# Patient Record
Sex: MALE | Race: OTHER | URBAN - METROPOLITAN AREA
[De-identification: names, ages, dates, MRNs, and addresses within clinical notes are randomized per-mention and may not be internally consistent; named-entity substitution may affect disease eponyms.]

---

## 2024-02-27 ENCOUNTER — APPOINTMENT (OUTPATIENT)
Dept: GENERAL RADIOLOGY | Age: 49
DRG: 871 | End: 2024-02-27
Payer: COMMERCIAL

## 2024-02-27 ENCOUNTER — APPOINTMENT (OUTPATIENT)
Dept: CT IMAGING | Age: 49
DRG: 871 | End: 2024-02-27
Payer: COMMERCIAL

## 2024-02-27 ENCOUNTER — HOSPITAL ENCOUNTER (INPATIENT)
Age: 49
LOS: 1 days | DRG: 871 | End: 2024-02-28
Attending: EMERGENCY MEDICINE | Admitting: STUDENT IN AN ORGANIZED HEALTH CARE EDUCATION/TRAINING PROGRAM
Payer: COMMERCIAL

## 2024-02-27 DIAGNOSIS — J96.01 ACUTE RESPIRATORY FAILURE WITH HYPOXIA (HCC): ICD-10-CM

## 2024-02-27 DIAGNOSIS — E11.10 DIABETIC KETOACIDOSIS WITHOUT COMA ASSOCIATED WITH TYPE 2 DIABETES MELLITUS (HCC): Primary | ICD-10-CM

## 2024-02-27 DIAGNOSIS — R79.89 ELEVATED LACTIC ACID LEVEL: ICD-10-CM

## 2024-02-27 PROBLEM — A41.9 SEPSIS (HCC): Status: ACTIVE | Noted: 2024-02-27

## 2024-02-27 LAB
ALBUMIN SERPL-MCNC: 2.7 G/DL (ref 3.4–5)
ALBUMIN/GLOB SERPL: 0.8 {RATIO} (ref 1.1–2.2)
ALP SERPL-CCNC: 80 U/L (ref 40–129)
ALT SERPL-CCNC: 51 U/L (ref 10–40)
ANION GAP SERPL CALCULATED.3IONS-SCNC: 35 MMOL/L (ref 3–16)
APTT BLD: 33.9 SEC (ref 22.7–35.9)
AST SERPL-CCNC: 81 U/L (ref 15–37)
BASE EXCESS BLDV CALC-SCNC: -20.7 MMOL/L
BASOPHILS # BLD: 0 K/UL (ref 0–0.2)
BASOPHILS NFR BLD: 0 %
BILIRUB SERPL-MCNC: 0.8 MG/DL (ref 0–1)
BUN SERPL-MCNC: 105 MG/DL (ref 7–20)
CALCIUM SERPL-MCNC: 8.2 MG/DL (ref 8.3–10.6)
CHLORIDE SERPL-SCNC: 79 MMOL/L (ref 99–110)
CO2 BLDV-SCNC: 12 MMOL/L
CO2 SERPL-SCNC: 9 MMOL/L (ref 21–32)
COHGB MFR BLDV: 1.4 %
CREAT SERPL-MCNC: 5.1 MG/DL (ref 0.9–1.3)
D DIMER: 1.71 UG/ML FEU (ref 0–0.6)
DEPRECATED RDW RBC AUTO: 14.1 % (ref 12.4–15.4)
DOHLE BOD BLD QL SMEAR: PRESENT
EOSINOPHIL # BLD: 0 K/UL (ref 0–0.6)
EOSINOPHIL NFR BLD: 0 %
FLUAV RNA UPPER RESP QL NAA+PROBE: NEGATIVE
FLUBV AG NPH QL: NEGATIVE
GFR SERPLBLD CREATININE-BSD FMLA CKD-EPI: 13 ML/MIN/{1.73_M2}
GLUCOSE BLD-MCNC: 409 MG/DL (ref 70–99)
GLUCOSE BLD-MCNC: 446 MG/DL (ref 70–99)
HCO3 BLDV-SCNC: 11 MMOL/L (ref 23–29)
HCT VFR BLD AUTO: 35.3 % (ref 40.5–52.5)
HGB BLD-MCNC: 11 G/DL (ref 13.5–17.5)
INR PPP: 1.59 (ref 0.84–1.16)
LACTATE BLDV-SCNC: 12.8 MMOL/L (ref 0.4–1.9)
LACTATE BLDV-SCNC: 14.9 MMOL/L (ref 0.4–1.9)
LYMPHOCYTES # BLD: 8 K/UL (ref 1–5.1)
LYMPHOCYTES NFR BLD: 32 %
MCH RBC QN AUTO: 27.9 PG (ref 26–34)
MCHC RBC AUTO-ENTMCNC: 31.1 G/DL (ref 31–36)
MCV RBC AUTO: 89.7 FL (ref 80–100)
METAMYELOCYTES NFR BLD MANUAL: 1 %
METHGB MFR BLDV: 0.3 %
MONOCYTES # BLD: 3.5 K/UL (ref 0–1.3)
MONOCYTES NFR BLD: 14 %
NEUTROPHILS # BLD: 13.6 K/UL (ref 1.7–7.7)
NEUTROPHILS NFR BLD: 26 %
NEUTS BAND NFR BLD MANUAL: 27 % (ref 0–7)
NEUTS VAC BLD QL SMEAR: PRESENT
NT-PROBNP SERPL-MCNC: 416 PG/ML (ref 0–124)
O2 THERAPY: ABNORMAL
PATH INTERP BLD-IMP: YES
PCO2 BLDV: 47.1 MMHG (ref 40–50)
PERFORMED ON: ABNORMAL
PERFORMED ON: ABNORMAL
PH BLDV: 6.96 [PH] (ref 7.35–7.45)
PLATELET # BLD AUTO: 208 K/UL (ref 135–450)
PLATELET BLD QL SMEAR: ADEQUATE
PMV BLD AUTO: 12.3 FL (ref 5–10.5)
PO2 BLDV: 35 MMHG
POTASSIUM SERPL-SCNC: 4 MMOL/L (ref 3.5–5.1)
PROT SERPL-MCNC: 6 G/DL (ref 6.4–8.2)
PROTHROMBIN TIME: 19 SEC (ref 11.5–14.8)
RBC # BLD AUTO: 3.93 M/UL (ref 4.2–5.9)
SAO2 % BLDV: 38 %
SARS-COV-2 RDRP RESP QL NAA+PROBE: NOT DETECTED
SMUDGE CELLS BLD QL SMEAR: PRESENT
SODIUM SERPL-SCNC: 123 MMOL/L (ref 136–145)
TOXIC GRANULES BLD QL SMEAR: PRESENT
TROPONIN, HIGH SENSITIVITY: 18 NG/L (ref 0–22)
WBC # BLD AUTO: 25.1 K/UL (ref 4–11)

## 2024-02-27 PROCEDURE — 36415 COLL VENOUS BLD VENIPUNCTURE: CPT

## 2024-02-27 PROCEDURE — 83036 HEMOGLOBIN GLYCOSYLATED A1C: CPT

## 2024-02-27 PROCEDURE — 99285 EMERGENCY DEPT VISIT HI MDM: CPT

## 2024-02-27 PROCEDURE — 84145 PROCALCITONIN (PCT): CPT

## 2024-02-27 PROCEDURE — 2580000003 HC RX 258: Performed by: EMERGENCY MEDICINE

## 2024-02-27 PROCEDURE — 84100 ASSAY OF PHOSPHORUS: CPT

## 2024-02-27 PROCEDURE — 93005 ELECTROCARDIOGRAM TRACING: CPT | Performed by: EMERGENCY MEDICINE

## 2024-02-27 PROCEDURE — 96365 THER/PROPH/DIAG IV INF INIT: CPT

## 2024-02-27 PROCEDURE — 6370000000 HC RX 637 (ALT 250 FOR IP): Performed by: EMERGENCY MEDICINE

## 2024-02-27 PROCEDURE — 85379 FIBRIN DEGRADATION QUANT: CPT

## 2024-02-27 PROCEDURE — 94761 N-INVAS EAR/PLS OXIMETRY MLT: CPT

## 2024-02-27 PROCEDURE — 84484 ASSAY OF TROPONIN QUANT: CPT

## 2024-02-27 PROCEDURE — 83880 ASSAY OF NATRIURETIC PEPTIDE: CPT

## 2024-02-27 PROCEDURE — 85730 THROMBOPLASTIN TIME PARTIAL: CPT

## 2024-02-27 PROCEDURE — 87804 INFLUENZA ASSAY W/OPTIC: CPT

## 2024-02-27 PROCEDURE — 83735 ASSAY OF MAGNESIUM: CPT

## 2024-02-27 PROCEDURE — 94640 AIRWAY INHALATION TREATMENT: CPT

## 2024-02-27 PROCEDURE — 71045 X-RAY EXAM CHEST 1 VIEW: CPT

## 2024-02-27 PROCEDURE — 82803 BLOOD GASES ANY COMBINATION: CPT

## 2024-02-27 PROCEDURE — 96375 TX/PRO/DX INJ NEW DRUG ADDON: CPT

## 2024-02-27 PROCEDURE — 83605 ASSAY OF LACTIC ACID: CPT

## 2024-02-27 PROCEDURE — 2700000000 HC OXYGEN THERAPY PER DAY

## 2024-02-27 PROCEDURE — 85025 COMPLETE CBC W/AUTO DIFF WBC: CPT

## 2024-02-27 PROCEDURE — 87181 SC STD AGAR DILUTION PER AGT: CPT

## 2024-02-27 PROCEDURE — 87186 SC STD MICRODIL/AGAR DIL: CPT

## 2024-02-27 PROCEDURE — 85610 PROTHROMBIN TIME: CPT

## 2024-02-27 PROCEDURE — 87040 BLOOD CULTURE FOR BACTERIA: CPT

## 2024-02-27 PROCEDURE — 6360000002 HC RX W HCPCS: Performed by: EMERGENCY MEDICINE

## 2024-02-27 PROCEDURE — 80053 COMPREHEN METABOLIC PANEL: CPT

## 2024-02-27 PROCEDURE — 87635 SARS-COV-2 COVID-19 AMP PRB: CPT

## 2024-02-27 PROCEDURE — 2100000000 HC CCU R&B

## 2024-02-27 PROCEDURE — 71250 CT THORAX DX C-: CPT

## 2024-02-27 PROCEDURE — 87150 DNA/RNA AMPLIFIED PROBE: CPT

## 2024-02-27 RX ORDER — ENOXAPARIN SODIUM 100 MG/ML
40 INJECTION SUBCUTANEOUS DAILY
Status: DISCONTINUED | OUTPATIENT
Start: 2024-02-28 | End: 2024-02-27

## 2024-02-27 RX ORDER — LINEZOLID 2 MG/ML
600 INJECTION, SOLUTION INTRAVENOUS ONCE
Status: COMPLETED | OUTPATIENT
Start: 2024-02-27 | End: 2024-02-28

## 2024-02-27 RX ORDER — HEPARIN SODIUM 5000 [USP'U]/ML
5000 INJECTION, SOLUTION INTRAVENOUS; SUBCUTANEOUS EVERY 8 HOURS SCHEDULED
Status: DISCONTINUED | OUTPATIENT
Start: 2024-02-28 | End: 2024-02-28 | Stop reason: HOSPADM

## 2024-02-27 RX ORDER — DEXAMETHASONE SODIUM PHOSPHATE 4 MG/ML
10 INJECTION, SOLUTION INTRA-ARTICULAR; INTRALESIONAL; INTRAMUSCULAR; INTRAVENOUS; SOFT TISSUE ONCE
Status: COMPLETED | OUTPATIENT
Start: 2024-02-27 | End: 2024-02-27

## 2024-02-27 RX ORDER — LINEZOLID 2 MG/ML
600 INJECTION, SOLUTION INTRAVENOUS EVERY 12 HOURS
Status: DISCONTINUED | OUTPATIENT
Start: 2024-02-28 | End: 2024-02-28 | Stop reason: HOSPADM

## 2024-02-27 RX ORDER — MAGNESIUM SULFATE IN WATER 40 MG/ML
2000 INJECTION, SOLUTION INTRAVENOUS PRN
Status: DISCONTINUED | OUTPATIENT
Start: 2024-02-27 | End: 2024-02-27

## 2024-02-27 RX ORDER — 0.9 % SODIUM CHLORIDE 0.9 %
1000 INTRAVENOUS SOLUTION INTRAVENOUS ONCE
Status: COMPLETED | OUTPATIENT
Start: 2024-02-27 | End: 2024-02-27

## 2024-02-27 RX ORDER — POTASSIUM CHLORIDE 7.45 MG/ML
10 INJECTION INTRAVENOUS PRN
Status: DISCONTINUED | OUTPATIENT
Start: 2024-02-27 | End: 2024-02-27

## 2024-02-27 RX ORDER — SODIUM CHLORIDE 9 MG/ML
INJECTION, SOLUTION INTRAVENOUS CONTINUOUS
Status: DISCONTINUED | OUTPATIENT
Start: 2024-02-27 | End: 2024-02-28 | Stop reason: HOSPADM

## 2024-02-27 RX ORDER — IPRATROPIUM BROMIDE AND ALBUTEROL SULFATE 2.5; .5 MG/3ML; MG/3ML
3 SOLUTION RESPIRATORY (INHALATION) ONCE
Status: COMPLETED | OUTPATIENT
Start: 2024-02-27 | End: 2024-02-27

## 2024-02-27 RX ORDER — DEXTROSE MONOHYDRATE, SODIUM CHLORIDE, AND POTASSIUM CHLORIDE 50; 1.49; 4.5 G/1000ML; G/1000ML; G/1000ML
INJECTION, SOLUTION INTRAVENOUS CONTINUOUS PRN
Status: DISCONTINUED | OUTPATIENT
Start: 2024-02-27 | End: 2024-02-28 | Stop reason: HOSPADM

## 2024-02-27 RX ADMIN — SODIUM CHLORIDE 7.72 UNITS/HR: 9 INJECTION, SOLUTION INTRAVENOUS at 21:54

## 2024-02-27 RX ADMIN — DEXAMETHASONE SODIUM PHOSPHATE 10 MG: 4 INJECTION, SOLUTION INTRAMUSCULAR; INTRAVENOUS at 21:02

## 2024-02-27 RX ADMIN — IPRATROPIUM BROMIDE AND ALBUTEROL SULFATE 3 DOSE: 2.5; .5 SOLUTION RESPIRATORY (INHALATION) at 20:54

## 2024-02-27 RX ADMIN — SODIUM CHLORIDE: 9 INJECTION, SOLUTION INTRAVENOUS at 21:43

## 2024-02-27 RX ADMIN — INSULIN HUMAN 6 UNITS: 100 INJECTION, SOLUTION PARENTERAL at 21:50

## 2024-02-27 RX ADMIN — CEFEPIME 2000 MG: 2 INJECTION, POWDER, FOR SOLUTION INTRAVENOUS at 22:00

## 2024-02-27 RX ADMIN — LINEZOLID 600 MG: 600 INJECTION, SOLUTION INTRAVENOUS at 22:54

## 2024-02-27 RX ADMIN — SODIUM CHLORIDE 1000 ML: 9 INJECTION, SOLUTION INTRAVENOUS at 21:21

## 2024-02-27 ASSESSMENT — PAIN DESCRIPTION - ORIENTATION: ORIENTATION: MID

## 2024-02-27 ASSESSMENT — PAIN DESCRIPTION - PAIN TYPE: TYPE: ACUTE PAIN

## 2024-02-27 ASSESSMENT — PAIN SCALES - GENERAL: PAINLEVEL_OUTOF10: 4

## 2024-02-27 ASSESSMENT — PAIN - FUNCTIONAL ASSESSMENT
PAIN_FUNCTIONAL_ASSESSMENT: PREVENTS OR INTERFERES SOME ACTIVE ACTIVITIES AND ADLS
PAIN_FUNCTIONAL_ASSESSMENT: 0-10

## 2024-02-27 ASSESSMENT — PAIN DESCRIPTION - DESCRIPTORS: DESCRIPTORS: ACHING

## 2024-02-27 ASSESSMENT — PAIN DESCRIPTION - LOCATION: LOCATION: CHEST

## 2024-02-28 VITALS
WEIGHT: 209 LBS | OXYGEN SATURATION: 7 % | BODY MASS INDEX: 35.68 KG/M2 | DIASTOLIC BLOOD PRESSURE: 159 MMHG | HEIGHT: 64 IN | SYSTOLIC BLOOD PRESSURE: 215 MMHG | TEMPERATURE: 97 F

## 2024-02-28 PROBLEM — D72.829 LEUKOCYTOSIS: Status: ACTIVE | Noted: 2024-02-28

## 2024-02-28 PROBLEM — E11.10 DKA (DIABETIC KETOACIDOSIS) (HCC): Status: ACTIVE | Noted: 2024-02-28

## 2024-02-28 PROBLEM — D64.9 ANEMIA: Status: ACTIVE | Noted: 2024-02-28

## 2024-02-28 PROBLEM — J18.9 PNEUMONIA: Status: ACTIVE | Noted: 2024-02-28

## 2024-02-28 PROBLEM — K31.1 GASTRIC OUTLET OBSTRUCTION: Status: ACTIVE | Noted: 2024-02-28

## 2024-02-28 PROBLEM — E87.20 LACTIC ACIDOSIS: Status: ACTIVE | Noted: 2024-02-28

## 2024-02-28 PROBLEM — E87.20 METABOLIC ACIDOSIS: Status: ACTIVE | Noted: 2024-02-28

## 2024-02-28 PROBLEM — N40.0 BPH (BENIGN PROSTATIC HYPERPLASIA): Status: ACTIVE | Noted: 2024-02-28

## 2024-02-28 PROBLEM — N17.9 AKI (ACUTE KIDNEY INJURY) (HCC): Status: ACTIVE | Noted: 2024-02-28

## 2024-02-28 PROBLEM — J96.01 ACUTE RESPIRATORY FAILURE WITH HYPOXIA (HCC): Status: ACTIVE | Noted: 2024-02-28

## 2024-02-28 LAB
25(OH)D3 SERPL-MCNC: 6 NG/ML
ABO + RH BLD: NORMAL
ANION GAP SERPL CALCULATED.3IONS-SCNC: 28 MMOL/L (ref 3–16)
ANION GAP SERPL CALCULATED.3IONS-SCNC: 34 MMOL/L (ref 3–16)
BASE EXCESS BLDV CALC-SCNC: -17 MMOL/L (ref -3–3)
BLD GP AB SCN SERPL QL: NORMAL
BLOOD BANK DISPENSE STATUS: NORMAL
BLOOD BANK PRODUCT CODE: NORMAL
BPU ID: NORMAL
BUN SERPL-MCNC: 111 MG/DL (ref 7–20)
BUN SERPL-MCNC: 112 MG/DL (ref 7–20)
CA-I BLD-SCNC: 1.1 MMOL/L (ref 1.12–1.32)
CALCIUM SERPL-MCNC: 7.7 MG/DL (ref 8.3–10.6)
CALCIUM SERPL-MCNC: 7.8 MG/DL (ref 8.3–10.6)
CHLORIDE SERPL-SCNC: 80 MMOL/L (ref 99–110)
CHLORIDE SERPL-SCNC: 89 MMOL/L (ref 99–110)
CO2 BLDV-SCNC: 14 MMOL/L
CO2 SERPL-SCNC: 13 MMOL/L (ref 21–32)
CO2 SERPL-SCNC: 9 MMOL/L (ref 21–32)
CREAT SERPL-MCNC: 4.2 MG/DL (ref 0.9–1.3)
CREAT SERPL-MCNC: 5.4 MG/DL (ref 0.9–1.3)
DEPRECATED RDW RBC AUTO: 13.6 % (ref 12.4–15.4)
DESCRIPTION BLOOD BANK: NORMAL
EKG ATRIAL RATE: 104 BPM
EKG DIAGNOSIS: NORMAL
EKG P AXIS: 65 DEGREES
EKG P-R INTERVAL: 146 MS
EKG Q-T INTERVAL: 376 MS
EKG QRS DURATION: 106 MS
EKG QTC CALCULATION (BAZETT): 494 MS
EKG R AXIS: 65 DEGREES
EKG T AXIS: -11 DEGREES
EKG VENTRICULAR RATE: 104 BPM
EST. AVERAGE GLUCOSE BLD GHB EST-MCNC: 234.6 MG/DL
GFR SERPLBLD CREATININE-BSD FMLA CKD-EPI: 12 ML/MIN/{1.73_M2}
GFR SERPLBLD CREATININE-BSD FMLA CKD-EPI: 17 ML/MIN/{1.73_M2}
GLUCOSE BLD-MCNC: 180 MG/DL (ref 70–99)
GLUCOSE BLD-MCNC: 248 MG/DL (ref 70–99)
GLUCOSE BLD-MCNC: 274 MG/DL (ref 70–99)
GLUCOSE BLD-MCNC: 315 MG/DL (ref 70–99)
GLUCOSE BLD-MCNC: 360 MG/DL (ref 70–99)
GLUCOSE BLD-MCNC: 387 MG/DL (ref 70–99)
GLUCOSE BLD-MCNC: 443 MG/DL (ref 70–99)
GLUCOSE SERPL-MCNC: 240 MG/DL (ref 70–99)
GLUCOSE SERPL-MCNC: 479 MG/DL (ref 70–99)
HBA1C MFR BLD: 9.8 %
HCO3 BLDV-SCNC: 12.7 MMOL/L (ref 23–29)
HCT VFR BLD AUTO: 25.1 % (ref 40.5–52.5)
HCT VFR BLD AUTO: 27 % (ref 40.5–52.5)
HEMOCCULT STL QL: ABNORMAL
HGB BLD CALC-MCNC: 9.1 GM/DL (ref 13.5–17.5)
HGB BLD-MCNC: 8.2 G/DL (ref 13.5–17.5)
LACTATE BLD-SCNC: 16.7 MMOL/L (ref 0.4–2)
MAGNESIUM SERPL-MCNC: 2.4 MG/DL (ref 1.8–2.4)
MAGNESIUM SERPL-MCNC: 3 MG/DL (ref 1.8–2.4)
MCH RBC QN AUTO: 27.7 PG (ref 26–34)
MCHC RBC AUTO-ENTMCNC: 32.5 G/DL (ref 31–36)
MCV RBC AUTO: 85.2 FL (ref 80–100)
PATH INTERP BLD-IMP: NORMAL
PCO2 BLDV: 42.4 MM HG (ref 40–50)
PERFORMED ON: ABNORMAL
PH BLDV: 7.08 [PH] (ref 7.35–7.45)
PHOSPHATE SERPL-MCNC: 5.1 MG/DL (ref 2.5–4.9)
PHOSPHATE SERPL-MCNC: 7.6 MG/DL (ref 2.5–4.9)
PLATELET # BLD AUTO: 177 K/UL (ref 135–450)
PMV BLD AUTO: 11.4 FL (ref 5–10.5)
PO2 BLDV: 35 MM HG
POC SAMPLE TYPE: ABNORMAL
POTASSIUM BLD-SCNC: 3.4 MMOL/L (ref 3.5–5.1)
POTASSIUM SERPL-SCNC: 3.5 MMOL/L (ref 3.5–5.1)
POTASSIUM SERPL-SCNC: 3.9 MMOL/L (ref 3.5–5.1)
PROCALCITONIN SERPL IA-MCNC: >100 NG/ML (ref 0–0.15)
PTH-INTACT SERPL-MCNC: 92.4 PG/ML (ref 14–72)
RBC # BLD AUTO: 2.95 M/UL (ref 4.2–5.9)
REPORT: NORMAL
SAO2 % BLDV: 47 %
SODIUM BLD-SCNC: 129 MMOL/L (ref 136–145)
SODIUM SERPL-SCNC: 123 MMOL/L (ref 136–145)
SODIUM SERPL-SCNC: 130 MMOL/L (ref 136–145)
WBC # BLD AUTO: 15.7 K/UL (ref 4–11)

## 2024-02-28 PROCEDURE — 2500000003 HC RX 250 WO HCPCS: Performed by: STUDENT IN AN ORGANIZED HEALTH CARE EDUCATION/TRAINING PROGRAM

## 2024-02-28 PROCEDURE — 86850 RBC ANTIBODY SCREEN: CPT

## 2024-02-28 PROCEDURE — 0BH17EZ INSERTION OF ENDOTRACHEAL AIRWAY INTO TRACHEA, VIA NATURAL OR ARTIFICIAL OPENING: ICD-10-PCS | Performed by: STUDENT IN AN ORGANIZED HEALTH CARE EDUCATION/TRAINING PROGRAM

## 2024-02-28 PROCEDURE — 80048 BASIC METABOLIC PNL TOTAL CA: CPT

## 2024-02-28 PROCEDURE — 83605 ASSAY OF LACTIC ACID: CPT

## 2024-02-28 PROCEDURE — 30233N1 TRANSFUSION OF NONAUTOLOGOUS RED BLOOD CELLS INTO PERIPHERAL VEIN, PERCUTANEOUS APPROACH: ICD-10-PCS | Performed by: STUDENT IN AN ORGANIZED HEALTH CARE EDUCATION/TRAINING PROGRAM

## 2024-02-28 PROCEDURE — 85014 HEMATOCRIT: CPT

## 2024-02-28 PROCEDURE — 86901 BLOOD TYPING SEROLOGIC RH(D): CPT

## 2024-02-28 PROCEDURE — 84100 ASSAY OF PHOSPHORUS: CPT

## 2024-02-28 PROCEDURE — 93010 ELECTROCARDIOGRAM REPORT: CPT | Performed by: INTERNAL MEDICINE

## 2024-02-28 PROCEDURE — 86900 BLOOD TYPING SEROLOGIC ABO: CPT

## 2024-02-28 PROCEDURE — 82270 OCCULT BLOOD FECES: CPT

## 2024-02-28 PROCEDURE — 86920 COMPATIBILITY TEST SPIN: CPT

## 2024-02-28 PROCEDURE — 2580000003 HC RX 258: Performed by: NURSE PRACTITIONER

## 2024-02-28 PROCEDURE — 84295 ASSAY OF SERUM SODIUM: CPT

## 2024-02-28 PROCEDURE — 6360000002 HC RX W HCPCS

## 2024-02-28 PROCEDURE — 85027 COMPLETE CBC AUTOMATED: CPT

## 2024-02-28 PROCEDURE — 83735 ASSAY OF MAGNESIUM: CPT

## 2024-02-28 PROCEDURE — 36556 INSERT NON-TUNNEL CV CATH: CPT

## 2024-02-28 PROCEDURE — 82330 ASSAY OF CALCIUM: CPT

## 2024-02-28 PROCEDURE — 86480 TB TEST CELL IMMUN MEASURE: CPT

## 2024-02-28 PROCEDURE — 82947 ASSAY GLUCOSE BLOOD QUANT: CPT

## 2024-02-28 PROCEDURE — 02HV33Z INSERTION OF INFUSION DEVICE INTO SUPERIOR VENA CAVA, PERCUTANEOUS APPROACH: ICD-10-PCS | Performed by: STUDENT IN AN ORGANIZED HEALTH CARE EDUCATION/TRAINING PROGRAM

## 2024-02-28 PROCEDURE — 2500000003 HC RX 250 WO HCPCS: Performed by: NURSE PRACTITIONER

## 2024-02-28 PROCEDURE — 36415 COLL VENOUS BLD VENIPUNCTURE: CPT

## 2024-02-28 PROCEDURE — 6360000002 HC RX W HCPCS: Performed by: STUDENT IN AN ORGANIZED HEALTH CARE EDUCATION/TRAINING PROGRAM

## 2024-02-28 PROCEDURE — 84132 ASSAY OF SERUM POTASSIUM: CPT

## 2024-02-28 PROCEDURE — 2500000003 HC RX 250 WO HCPCS: Performed by: EMERGENCY MEDICINE

## 2024-02-28 PROCEDURE — P9016 RBC LEUKOCYTES REDUCED: HCPCS

## 2024-02-28 PROCEDURE — 82803 BLOOD GASES ANY COMBINATION: CPT

## 2024-02-28 PROCEDURE — 82306 VITAMIN D 25 HYDROXY: CPT

## 2024-02-28 PROCEDURE — 6360000002 HC RX W HCPCS: Performed by: NURSE PRACTITIONER

## 2024-02-28 PROCEDURE — 83970 ASSAY OF PARATHORMONE: CPT

## 2024-02-28 PROCEDURE — 51702 INSERT TEMP BLADDER CATH: CPT

## 2024-02-28 RX ORDER — SODIUM CHLORIDE 0.9 % (FLUSH) 0.9 %
5-40 SYRINGE (ML) INJECTION PRN
Status: DISCONTINUED | OUTPATIENT
Start: 2024-02-28 | End: 2024-02-28 | Stop reason: HOSPADM

## 2024-02-28 RX ORDER — EPINEPHRINE IN SOD CHLOR,ISO 1 MG/10 ML
SYRINGE (ML) INTRAVENOUS
Status: DISCONTINUED
Start: 2024-02-28 | End: 2024-02-28 | Stop reason: HOSPADM

## 2024-02-28 RX ORDER — MAGNESIUM SULFATE IN WATER 40 MG/ML
2000 INJECTION, SOLUTION INTRAVENOUS PRN
Status: DISCONTINUED | OUTPATIENT
Start: 2024-02-28 | End: 2024-02-28

## 2024-02-28 RX ORDER — KETAMINE HYDROCHLORIDE 50 MG/ML
150 INJECTION, SOLUTION INTRAMUSCULAR; INTRAVENOUS ONCE
Status: DISCONTINUED | OUTPATIENT
Start: 2024-02-28 | End: 2024-02-28 | Stop reason: HOSPADM

## 2024-02-28 RX ORDER — SODIUM CHLORIDE 0.9 % (FLUSH) 0.9 %
5-40 SYRINGE (ML) INJECTION EVERY 12 HOURS SCHEDULED
Status: DISCONTINUED | OUTPATIENT
Start: 2024-02-28 | End: 2024-02-28 | Stop reason: HOSPADM

## 2024-02-28 RX ORDER — HALOPERIDOL 5 MG/ML
2 INJECTION INTRAMUSCULAR ONCE
Status: COMPLETED | OUTPATIENT
Start: 2024-02-28 | End: 2024-02-28

## 2024-02-28 RX ORDER — POTASSIUM CHLORIDE 20 MEQ/1
40 TABLET, EXTENDED RELEASE ORAL ONCE
Status: DISCONTINUED | OUTPATIENT
Start: 2024-02-28 | End: 2024-02-28 | Stop reason: HOSPADM

## 2024-02-28 RX ORDER — POTASSIUM CHLORIDE 7.45 MG/ML
10 INJECTION INTRAVENOUS PRN
Status: DISCONTINUED | OUTPATIENT
Start: 2024-02-28 | End: 2024-02-28

## 2024-02-28 RX ORDER — NOREPINEPHRINE BITARTRATE 0.06 MG/ML
INJECTION, SOLUTION INTRAVENOUS
Status: DISCONTINUED
Start: 2024-02-28 | End: 2024-02-28 | Stop reason: HOSPADM

## 2024-02-28 RX ORDER — POLYETHYLENE GLYCOL 3350 17 G/17G
17 POWDER, FOR SOLUTION ORAL DAILY PRN
Status: DISCONTINUED | OUTPATIENT
Start: 2024-02-28 | End: 2024-02-28 | Stop reason: HOSPADM

## 2024-02-28 RX ORDER — ONDANSETRON 2 MG/ML
4 INJECTION INTRAMUSCULAR; INTRAVENOUS EVERY 6 HOURS PRN
Status: DISCONTINUED | OUTPATIENT
Start: 2024-02-28 | End: 2024-02-28 | Stop reason: HOSPADM

## 2024-02-28 RX ORDER — ACETAMINOPHEN 650 MG/1
650 SUPPOSITORY RECTAL EVERY 6 HOURS PRN
Status: DISCONTINUED | OUTPATIENT
Start: 2024-02-28 | End: 2024-02-28 | Stop reason: HOSPADM

## 2024-02-28 RX ORDER — POTASSIUM CHLORIDE 29.8 MG/ML
20 INJECTION INTRAVENOUS ONCE
Status: DISCONTINUED | OUTPATIENT
Start: 2024-02-28 | End: 2024-02-28 | Stop reason: HOSPADM

## 2024-02-28 RX ORDER — MIDAZOLAM HYDROCHLORIDE 1 MG/ML
INJECTION INTRAMUSCULAR; INTRAVENOUS
Status: COMPLETED
Start: 2024-02-28 | End: 2024-02-28

## 2024-02-28 RX ORDER — CALCIUM GLUCONATE 20 MG/ML
1000 INJECTION, SOLUTION INTRAVENOUS ONCE
Status: COMPLETED | OUTPATIENT
Start: 2024-02-28 | End: 2024-02-28

## 2024-02-28 RX ORDER — SODIUM CHLORIDE 9 MG/ML
INJECTION, SOLUTION INTRAVENOUS PRN
Status: DISCONTINUED | OUTPATIENT
Start: 2024-02-28 | End: 2024-02-28 | Stop reason: HOSPADM

## 2024-02-28 RX ORDER — ONDANSETRON 4 MG/1
4 TABLET, ORALLY DISINTEGRATING ORAL EVERY 8 HOURS PRN
Status: DISCONTINUED | OUTPATIENT
Start: 2024-02-28 | End: 2024-02-28 | Stop reason: HOSPADM

## 2024-02-28 RX ORDER — POTASSIUM CHLORIDE 20 MEQ/1
40 TABLET, EXTENDED RELEASE ORAL PRN
Status: DISCONTINUED | OUTPATIENT
Start: 2024-02-28 | End: 2024-02-28

## 2024-02-28 RX ORDER — ACETAMINOPHEN 325 MG/1
650 TABLET ORAL EVERY 6 HOURS PRN
Status: DISCONTINUED | OUTPATIENT
Start: 2024-02-28 | End: 2024-02-28 | Stop reason: HOSPADM

## 2024-02-28 RX ORDER — MIDAZOLAM HYDROCHLORIDE 1 MG/ML
1 INJECTION INTRAMUSCULAR; INTRAVENOUS ONCE
Status: COMPLETED | OUTPATIENT
Start: 2024-02-28 | End: 2024-02-28

## 2024-02-28 RX ADMIN — MIDAZOLAM HYDROCHLORIDE 1 MG: 1 INJECTION INTRAMUSCULAR; INTRAVENOUS at 03:21

## 2024-02-28 RX ADMIN — HALOPERIDOL LACTATE 2 MG: 5 INJECTION, SOLUTION INTRAMUSCULAR at 02:57

## 2024-02-28 RX ADMIN — POTASSIUM CHLORIDE, DEXTROSE MONOHYDRATE AND SODIUM CHLORIDE: 150; 5; 450 INJECTION, SOLUTION INTRAVENOUS at 04:24

## 2024-02-28 RX ADMIN — HALOPERIDOL LACTATE 2 MG: 5 INJECTION, SOLUTION INTRAMUSCULAR at 03:26

## 2024-02-28 RX ADMIN — SODIUM BICARBONATE: 84 INJECTION, SOLUTION INTRAVENOUS at 03:52

## 2024-02-28 RX ADMIN — SODIUM BICARBONATE 50 MEQ: 84 INJECTION, SOLUTION INTRAVENOUS at 02:21

## 2024-02-28 RX ADMIN — CALCIUM GLUCONATE 1000 MG: 20 INJECTION, SOLUTION INTRAVENOUS at 02:41

## 2024-02-28 RX ADMIN — MIDAZOLAM 1 MG: 1 INJECTION INTRAMUSCULAR; INTRAVENOUS at 03:21

## 2024-02-28 ASSESSMENT — ENCOUNTER SYMPTOMS
EYES NEGATIVE: 1
COUGH: 1
SHORTNESS OF BREATH: 1
ALLERGIC/IMMUNOLOGIC NEGATIVE: 1
BLOOD IN STOOL: 1

## 2024-02-28 NOTE — H&P
COMPARISON: None. HISTORY: ORDERING SYSTEM PROVIDED HISTORY: soa TECHNOLOGIST PROVIDED HISTORY: Reason for exam:->soa Reason for Exam: Shortness of Breath FINDINGS: There is pleural and/or parenchymal disease opacifying the inferior 2/3 of the right hemithorax.  The left lung is clear.  Heart size difficult to assess but is likely borderline enlarged.  No acute bone finding.     Pleural and/or parenchymal disease opacifying the inferior 2/3 of the right hemithorax.  Follow-up CT chest with contrast is recommended.       Electronically signed by Odalys Man DO on 2/28/2024 at 1:37 AM     Declines

## 2024-02-28 NOTE — PROGRESS NOTES
Pt's family appear to be coping with the death of pt and are supporting one another at this time.     02/28/24 0827   Encounter Summary   Encounter Overview/Reason  Grief, Loss, and Adjustments;Spiritual/Emotional Needs   Service Provided For: Family   Referral/Consult From: Nurse   Support System Children;Family members;Parent   Last Encounter  02/28/24  (support and prayer CL)   Complexity of Encounter High   Begin Time 0645   End Time  0815   Total Time Calculated 90 min   Spiritual/Emotional needs   Type Spiritual Support   Grief, Loss, and Adjustments   Type Grief and loss;Death   Assessment/Intervention/Outcome   Assessment Calm;Sad;Tearful   Intervention Active listening;Discussed illness injury and it’s impact;Discussed belief system/Latter-day practices/leora;Explored/Affirmed feelings, thoughts, concerns;Grief Care;Prayer (assurance of)/Coila   Outcome Coping;Engaged in conversation;Expressed Gratitude;Grieving

## 2024-02-28 NOTE — DEATH NOTES
Death Pronouncement Note  Patient's Name: Chino Hennessy   Patient's YOB: 1975  MRN Number: [id number]    Admitting Provider: Odalys Man DO  Attending Provider: Jamie Verma MD    Patient was examined and the following were absent: Pulses, Blood Pressure, and Respiratory effort    I declared the patient dead on 2/28/24 at 0545    Preliminary Cause of Death:     Septic shock  Gastric outlet obstruction  Hemorrhagic shock  Undifferentiated GI bleed  Severe MONI    Electronically signed by Odalys Man DO on 2/28/24 at 6:02 AM EST

## 2024-02-28 NOTE — PROCEDURES
Emergency/urgent intubation note     24  Patient name: Chino Hennessy : 1975    Admitting/attending: Jamie Verma MD / Jamie Verma MD  Admit date: 2024  8:41 PM     Time of intubation: 05:21    Patient had further melanic mixed with hematochezia BM. GI reached out with request to intubate given patient not tolerating NGT placement which is urgently needed for decompression. Family, patient's adult sister, consents. Patients parents are in NH. He has two nonadult children and is not . Explained risks vs need to decompress urgently and intubation needed for bowel prep and GI procedures if they can be done today. She endorsed understanding and consented on behalf of the patient.    Patient identified per policy, examined and pertinent data reviewed.  Standard monitors used including ECG, blood pressure and pulse oximetry.  Patient preoxygenated with oxygen per rebreather and 100%.  Patient was notably lethargic. Induced with Versed 2 mg IV, ketamine 150 mg IV. He had mallampati III, short neck with anterior airway. Thick mucus, several missing teeth and poor dentition in varying stages of decay. Patient was paralyzed with rocuronium 25 mg followed by another 25 mg for paralytic.  Oropharynx contained .  Placement was difficult visualization with good anesthesia.  End-tidal CO2 was positive and breath sounds were positive bilaterally.     However at the end of intubation he developed VF. See further code documentation.    Electronically signed by Odalys Man DO on 2024 at 5:52 AM

## 2024-02-28 NOTE — ED PROVIDER NOTES
within normal limits   PROCALCITONIN - Abnormal; Notable for the following components:    Procalcitonin >100.00 (*)     All other components within normal limits   BASIC METABOLIC PANEL - Abnormal; Notable for the following components:    Sodium 123 (*)     Chloride 80 (*)     CO2 9 (*)     Anion Gap 34 (*)     Glucose 479 (*)      (*)     Creatinine 5.4 (*)     Est, Glom Filt Rate 12 (*)     Calcium 7.7 (*)     All other components within normal limits    Narrative:     CALL  [employer] tel. [phone],  Chemistry results called to and read back by WILBERT BURKS RN, 02/28/2024  00:42, by EMABR   PHOSPHORUS - Abnormal; Notable for the following components:    Phosphorus 7.6 (*)     All other components within normal limits    Narrative:     CALL  [employer] tel. [phone],  Chemistry results called to and read back by WILBERT BURKS RN, 02/28/2024  00:42, [facility]   POCT GLUCOSE - Abnormal; Notable for the following components:    POC Glucose 446 (*)     All other components within normal limits   POCT GLUCOSE - Abnormal; Notable for the following components:    POC Glucose 409 (*)     All other components within normal limits   POCT GLUCOSE - Abnormal; Notable for the following components:    POC Glucose 443 (*)     All other components within normal limits   POCT GLUCOSE - Abnormal; Notable for the following components:    POC Glucose 387 (*)     All other components within normal limits   RAPID INFLUENZA A/B ANTIGENS   COVID-19, RAPID   CULTURE, BLOOD 1   CULTURE, BLOOD 2   MRSA DNA PROBE, NASAL   OCCULT BLOOD STOOL IMMUNOASSAY   QUANTIFERON, INCUBATED   CULTURE, RESPIRATORY   TROPONIN    Narrative:     CALL  [employer] tel. [phone],  Chemistry results called to and read back by LEDA Clark, 02/27/2024 21:19, by  LANG   APTT   MAGNESIUM    Narrative:     CALL  [employer] tel. [phone],  Chemistry results called to and read back by WILBERT BURKS RN, 02/28/2024  00:42, by EMABR   HEMOGLOBIN A1C

## 2024-02-28 NOTE — PROGRESS NOTES
0510 CPR     0511 Defib    Vfib      0511 Epi     0512 Asystole     0513 Epi    0514  Pulseless asystole    0515 Epi    0517 Asystole       Bi Carb in 0517    0518 epi     0519 Asystole     0521 Epi- pulse check- asystole  0523- Pulse check- asystole    0524 Epi    0527 Epi    0527: bicarb, 1g cacl    0528: pulse check asystole. Resume CPR    0529: epi    0530 Pulse check asystole. Resume CPR    0531 EPI. Blood started    0532 pulse check    0533 epi. 2 PRBC started    0534 asystole    0535 bicarb  0536  epi asystole    0537 1st PRBC done. Asystole    0538: Epi    0539: 2 PRBC completed. Asystole. Resume CPR    0540: 3rd PRBC started. Aystole Resume CPR. Epi given    0542: Epi. Pulse check Asystole. Resume CPR    0543: Difficulty bagging d/t blood in ETT    0544: Asystole. Unable to bag at this time d/t blood in ETT. Epi given    0546: Code end. TOD called by Dr. Man.

## 2024-02-28 NOTE — PROGRESS NOTES
Further family at bedside including sister, nephew, father and two further male family members.     Answered any questions to the best of my ability regarding events overnight, from the course of admission to his decline and demise.     Father states that the patient lived with him and had not endorsed any nausea vomiting diarrhea, blood in stools. Thinks that he had been eating and drinking normally, drank water. Says that he had not been lethargic either, as the patient's sister had found him tonight. They are confused that he was well to them and walking and talking as normal.    The patient's labs were much worse than his appearance, so he may have arrived at a period of decline, the exact reason is unknown and we cannot complete workup of the cause. Initially suspected septic shock. With GI bleeding there is the suspicion of hemorrhagic shock that was unknown as he did not complain of bleed and his initial hgb normal. After imaging suggested gastric outlet obstruction, the patient did not tolerate NGT which was needed for decompression. It was at this time that he had hematochezia with melena and GI reached out to hospitalist for ETT to optimize status for decompression and further endoscopy observations. We obtained consent and did so.     Explained to family how dehydrated patient was during line placement for critical care medications. The abdominal distention was also notable on imaging and became worsening with our serial abdominal exams. Intubation successful but the patient lost pulse thereafter and had notable bleeding from other sites. He had not received any form of anticoagulation during his stay.     The father thought of intubation as blocking the airway. Explained that invasive procedures we perform are to improve symptoms and would not block the airway, we had successfully achieved airway access but unfortunately were unable to relieve the abdominal distention. Some of his other organs (MONI) were

## 2024-02-28 NOTE — SIGNIFICANT EVENT
Was at bedside for code BLUE.     After successful intubation with nellcor ETT color change indicative of appropriate placement, breath sounds bilaterally, the patient developed VF, was shocked, followed by PEA arrest. Numerous rounds of ACLS. Family was updated by myself multiple times during arrest.     Blood from bag mask obstructed the ability to oxygenate. NGT attempted by staff but unsuccessful. There was further bleeding from nose and worsening of abdominal distention making compressions increasingly challenging. 2 units Emergent blood, 3 amps bicarb, Jayy chloride were given to optimize based on labs at initiation of code. Unfortunately ROSC was not able to be achieved at any time.     Sister and nephew were updated at intervals. Sister after some delay was able to reach patient's father but would be 40+ min for arrival. Multiple bedside providers including myself, other nocturnist, ICU TL expressed that despite our best efforts the prognosis was very poor and worsening with time including anoxic brain injury. We did need to ultimately call the code and declare TOD.    Electronically signed by Odalys Man DO on 2/28/2024 at 6:15 AM

## 2024-02-28 NOTE — PROGRESS NOTES
Pt arrived @ 0140 with ED RN MONCHO Jason RN. Pt was alert and oriented x4, calm cooperative, HR sinus tach, 9L HFNC, insulin GTT  0148 miller placed for fluid volume management    -NGT attempted x2 unsuccessfully due to patient not tolerating intervention and activly pulling lines against both RN's efforts to calm patient.    - phlebotomist failed attempt to get labs, NP Florencia made aware, attending to place Central line due to limited access for required medications and lab draws.  - Dr. Man at bedside to place central line, after an attempt of RIJ central line, Dr Man decided it would be best to place the line in the Rfem. R fem placed successfully.      - this RN contacted on call GI Dr. Pepper. He was informed of attempts to obtain NGT with no success. Physician recommending intubation with NGT insertion for scope prep in the morning. This RN contacted Dr. Man that GI doctor wanted to discuss patient plan of care.     0356 VBG obtained to inform critical care consult.    -Dr. Man returned to bedside. Decision to intubate due to patients health status and plans with GI.  - patient prepped for RSI, patient is a difficult intubation, O2 sat declining, heart arrhythmias appearing on monitor, Dr. Man successful placed ETT, but patient found to be pulseless with electrical activity. Compressions started at 0509. Code blue called  - pt required 3units PRBC during code. Multiple rounds of compressions given, with no underlying rhythm found. Patients abdomen becoming more and more distended/taut. Dr. Man declared time of death 0545.

## 2024-02-28 NOTE — PROGRESS NOTES
Patient's family left bedside and all patient belongings given to patient's mother Philomena and father. Security and transport notified and escorted the patient to the [facility]. Post mortem paperwork printed, signed, and given to transport.

## 2024-02-28 NOTE — PROCEDURES
Patient name: Chino Hennessy  Medical record number: [id number]   Date:  2/28/24 Room/Bed: Z9V-8299/1303-01    Central Line placement procedure note    Indication: IV access, critical drips    Consent: Patient who is mentating and family at bedside    Procedure: The patient was positioned appropriately and the skin over the right IJ was prepped with chlorhexidine in the usual sterile fashion.  Local anesthesia was lidocaine 2% without epinephrine.  A large bore needle and real-time ultrasound were used to identify the vein.  A guidewire was then inserted into the vein with good flash then disappearance, dependent on the patients respirations indicating severe dehydration. 3 attempts were made without success before moving the procedure to the right femoral. At that time, the initial steps were repeated with US visualization, sterilization, local anesthesia and then successful cannulation. through the needle.  A triple-lumen catheter 16 cm was then inserted into the vessel over the guidewire using the Seldinger technique.  All ports showed good, free-flowing blood return and were flushed with saline solution.  The catheter was then securely fashioned to the skin with sutures and covered with a sterile dressing.  A postprocedure x-ray was not needed due to femoral access site.    The patient tolerated the procedure well. He required sedation with haldol 2 mg IV x2 and 1 mg IV versed for anxiety.    Complications: None.  EBL: minimal at both site RIJ and Right femoral vein.    Electronically signed by Odalys Man DO on 2/28/2024 at 3:48 AM

## 2024-02-29 LAB — EMERGENCY ISSUE BLOOD PRODUCTS SIGNED FORM: NORMAL

## 2024-02-29 NOTE — DISCHARGE SUMMARY
Hospital Medicine Discharge Summary    Patient: Chino Hennessy     Gender: male  : 1975   Age: 49 y.o.  MRN: [id number]    Admitting Physician: Odalys Man DO  Discharge Physician: Odalys Man DO     Code Status: FULL    Admit Date: 2024   Discharge Date: 2024      Disposition:      Discharge Diagnoses:    Septic shock  Gastric outlet obstruction  Hemorrhagic shock  Undifferentiated GI bleed  Severe MONI    Active Hospital Problems    Diagnosis Date Noted    DKA (diabetic ketoacidosis) (HCC) [E11.10] 2024    Metabolic acidosis [E87.20] 2024    Lactic acidosis [E87.20] 2024    Acute respiratory failure with hypoxia (HCC) [J96.01] 2024    Pneumonia [J18.9] 2024    Gastric outlet obstruction [K31.1] 2024    MONI (acute kidney injury) (HCC) [N17.9] 2024    Anemia [D64.9] 2024    BPH (benign prostatic hyperplasia) [N40.0] 2024    Leukocytosis [D72.829] 2024    Sepsis (HCC) [A41.9] 2024       Follow-up appointments:      Outpatient to do list:     Condition at Discharge:  Terminal    Hospital Course:   Patient admitted overnight with severe sepsis, DKA without prior history diabetes, severe metabolic acidosis, acute respiratory failure, right infrahilar consolidation treated empirically as pneumonia, gastric outlet obstruction, severe MONI, leukocytosis and anemia. He had only dyspnea over the past 2-4 days. Father with whom he lived did not notice any changes to mentation, breathing, intake, general status and the patient had not complained of abdominal pain or blood in BM. A central line was placed for critical care medications and IV access. Procedure attending noted significant dehydration with collapsible RIJ with respirations, unsuccessful, femoral line established. NGT was not tolerated by patient for his gastric outlet obstruction, GI recommended intubating for decompression and procedures. Consent was obtained

## 2024-03-01 LAB
BACTERIA BLD CULT: ABNORMAL
BACTERIA BLD CULT: ABNORMAL
GAMMA INTERFERON BACKGROUND BLD IA-ACNC: 0.01 IU/ML
MITOGEN IGNF BCKGRD COR BLD-ACNC: 1.28 IU/ML
ORGANISM: ABNORMAL
ORGANISM: ABNORMAL
QUANTI TB GOLD PLUS: NEGATIVE
QUANTI TB1 MINUS NIL: 0 IU/ML (ref 0–0.34)
QUANTI TB2 MINUS NIL: 0 IU/ML (ref 0–0.34)